# Patient Record
Sex: MALE | Race: WHITE | NOT HISPANIC OR LATINO | Employment: FULL TIME | URBAN - METROPOLITAN AREA
[De-identification: names, ages, dates, MRNs, and addresses within clinical notes are randomized per-mention and may not be internally consistent; named-entity substitution may affect disease eponyms.]

---

## 2023-02-14 ENCOUNTER — TRANSFERRED RECORDS (OUTPATIENT)
Dept: HEALTH INFORMATION MANAGEMENT | Facility: CLINIC | Age: 28
End: 2023-02-14
Payer: COMMERCIAL

## 2023-02-15 ENCOUNTER — TRANSFERRED RECORDS (OUTPATIENT)
Dept: HEALTH INFORMATION MANAGEMENT | Facility: CLINIC | Age: 28
End: 2023-02-15
Payer: COMMERCIAL

## 2023-02-20 PROCEDURE — 99285 EMERGENCY DEPT VISIT HI MDM: CPT | Performed by: EMERGENCY MEDICINE

## 2023-02-20 PROCEDURE — 96375 TX/PRO/DX INJ NEW DRUG ADDON: CPT

## 2023-02-20 PROCEDURE — 99285 EMERGENCY DEPT VISIT HI MDM: CPT | Mod: 25

## 2023-02-20 PROCEDURE — 96374 THER/PROPH/DIAG INJ IV PUSH: CPT

## 2023-02-21 ENCOUNTER — HOSPITAL ENCOUNTER (EMERGENCY)
Facility: CLINIC | Age: 28
Discharge: HOME | End: 2023-02-21
Attending: EMERGENCY MEDICINE | Admitting: EMERGENCY MEDICINE
Payer: COMMERCIAL

## 2023-02-21 VITALS
TEMPERATURE: 99.5 F | WEIGHT: 172 LBS | SYSTOLIC BLOOD PRESSURE: 114 MMHG | RESPIRATION RATE: 16 BRPM | OXYGEN SATURATION: 94 % | DIASTOLIC BLOOD PRESSURE: 64 MMHG | HEIGHT: 70 IN | HEART RATE: 83 BPM | BODY MASS INDEX: 24.62 KG/M2

## 2023-02-21 DIAGNOSIS — L50.9 URTICARIA: ICD-10-CM

## 2023-02-21 LAB
GROUP A STREP BY PCR: NOT DETECTED
HOLD SPECIMEN: NORMAL
HOLD SPECIMEN: NORMAL

## 2023-02-21 PROCEDURE — 96372 THER/PROPH/DIAG INJ SC/IM: CPT

## 2023-02-21 PROCEDURE — 250N000011 HC RX IP 250 OP 636: Performed by: EMERGENCY MEDICINE

## 2023-02-21 PROCEDURE — 250N000009 HC RX 250: Performed by: EMERGENCY MEDICINE

## 2023-02-21 PROCEDURE — 87651 STREP A DNA AMP PROBE: CPT | Performed by: EMERGENCY MEDICINE

## 2023-02-21 RX ORDER — DIPHENHYDRAMINE HYDROCHLORIDE 50 MG/ML
25 INJECTION INTRAMUSCULAR; INTRAVENOUS ONCE
Status: COMPLETED | OUTPATIENT
Start: 2023-02-21 | End: 2023-02-21

## 2023-02-21 RX ORDER — EPINEPHRINE 0.3 MG/.3ML
0.3 INJECTION SUBCUTANEOUS PRN
Qty: 2 EACH | Refills: 0 | Status: SHIPPED | OUTPATIENT
Start: 2023-02-21

## 2023-02-21 RX ORDER — EPINEPHRINE 0.3 MG/.3ML
0.3 INJECTION SUBCUTANEOUS ONCE
Status: DISCONTINUED | OUTPATIENT
Start: 2023-02-21 | End: 2023-02-21

## 2023-02-21 RX ORDER — METHYLPREDNISOLONE SODIUM SUCCINATE 125 MG/2ML
125 INJECTION, POWDER, LYOPHILIZED, FOR SOLUTION INTRAMUSCULAR; INTRAVENOUS ONCE
Status: COMPLETED | OUTPATIENT
Start: 2023-02-21 | End: 2023-02-21

## 2023-02-21 RX ADMIN — EPINEPHRINE 0.5 MG: 1 INJECTION INTRAMUSCULAR; INTRAVENOUS; SUBCUTANEOUS at 01:15

## 2023-02-21 RX ADMIN — FAMOTIDINE 20 MG: 10 INJECTION, SOLUTION INTRAVENOUS at 01:11

## 2023-02-21 RX ADMIN — METHYLPREDNISOLONE SODIUM SUCCINATE 125 MG: 125 INJECTION, POWDER, FOR SOLUTION INTRAMUSCULAR; INTRAVENOUS at 01:13

## 2023-02-21 RX ADMIN — DIPHENHYDRAMINE HYDROCHLORIDE 25 MG: 50 INJECTION, SOLUTION INTRAMUSCULAR; INTRAVENOUS at 01:13

## 2023-02-21 ASSESSMENT — ACTIVITIES OF DAILY LIVING (ADL)
ADLS_ACUITY_SCORE: 33
ADLS_ACUITY_SCORE: 35

## 2023-02-21 NOTE — DISCHARGE INSTRUCTIONS
Please make an appointment to follow up with Primary [facility] - [facility] (phone: [phone]) if not improving.    If you develop throat closing sensation, swelling in your mouth, shortness of breath, vomiting or diarrhea use the epinephrine pen return to the emergency department

## 2023-02-21 NOTE — ED PROVIDER NOTES
"    [facility] EMERGENCY DEPARTMENT (Formerly [facility])    2/21/23      History     Chief Complaint   Patient presents with     Allergic Reaction     The history is provided by the patient.     Michael Lindo is a 27 year old male who presents to the Emergency Department with hives. Patient reports he developed hives nearly 2 weeks ago, on 2/8, immediately after touching a succulent plant in potting soil. He has since gotten rid of the plant and cleaned his home. Patient reports last week he went to urgent care on both 2/14 and 2/15 and was started on cetirizine. Patient reports he was also started on a 5 day course of prednisone. He finished this on Sunday, 2/19, and in less than 24 hours symptoms have \"exploded\" again. He states he has hives from head to toe, states they are itchy. Patient reports yesterday, 2/20, he developed throat tightening and pain in his throat, the sides of his tongue, cheeks, and teeth. Patient reports he has been swallowing secretions but states he has pain with swallowing. Patient reports he feels like he has to think really hard in order to get enough air in. He denies voice change. Patient denies known food allergies. No new foods, detergents, or soaps. He has not had symptoms like this in the past. Patient denies vomiting or diarrhea. Patient reports he also tested positive for COVID during this, believes he tested positive on 2/12. He states he was then found to have pneumonia and was started on amoxicillin on 2/15.    Past Medical History  No past medical history on file.  No past surgical history on file.  No current outpatient medications on file.    No Known Allergies  Family History  No family history on file.  Social History          A medically appropriate review of systems was performed with pertinent positives and negatives noted in the HPI, and all other systems negative.    Physical Exam   BP: 128/75  Pulse: 117  Temp: 99.5  F (37.5  C)  Resp: 16  Height: 177.8 cm (5' " "10\")  Weight: 78 kg (172 lb)  SpO2: 96 %  Physical Exam  Physical Exam   Constitutional: oriented to person, place, and time. appears well-developed and well-nourished.   HENT:   Head: Normocephalic and atraumatic. No intraoral swelling of the tongue or posterior pharynx.  Neck: Normal range of motion. No stridor.  Pulmonary/Chest: Effort normal. No respiratory distress.   Cardiac: No murmurs, rubs, gallops. RRR.  Abdominal: Abdomen soft, nontender, nondistended. No rebound tenderness.  MSK: Long bones without deformity or evidence of trauma  Neurological: alert and oriented to person, place, and time.   Skin: Skin is warm and dry. Urticaria over arms, abdomen, neck  Psychiatric:  normal mood and affect.  behavior is normal. Thought content normal.      ED Course, Procedures, & Data   12:47 AM  The patient was seen and examined by Mikhail Farias MD in Room ED15.      Procedures       ED Course Selections:   Critical Care Addendum  My initial assessment, based on my focused history and physical exam, established a high suspicion that Michael Lindo has anaphylaxis, which requires immediate intervention, and therefore he is critically ill.     After the initial assessment, the care team initiated medication therapy with epi pen, methylprednisone, benadryl to provide stabilization care. Due to the critical nature of this patient, I reassessed vital signs, physical exam and respiratory status multiple times prior to his disposition.     Time also spent performing documentation and reviewing test results.     Critical care time (excluding teaching time and procedures): 30 minutes.                Results for orders placed or performed during the hospital encounter of 02/21/23   [place] Draw     Status: None (In process)    Narrative    The following orders were created for panel order [place] Draw.  Procedure                               Abnormality         Status                     ---------                          "      -----------         ------                     Extra Green Top (Lithium...[[id number]]                      In process                 Extra Purple Top Tube[[id number]]                            In process                   Please view results for these tests on the individual orders.     Medications - No data to display  Labs Ordered and Resulted from Time of ED Arrival to Time of ED Departure - No data to display  No orders to display              Medical Decision Making  The patient's presentation was of high complexity (an acute health issue posing potential threat to life or bodily function).    The patient's evaluation involved:  strong consideration of a test (ct neck) that was ultimately deferred    The patient's management necessitated moderate risk (prescription drug management including medications given in the ED) and high risk (drug therapy requiring intensive monitoring (epipen)).      Assessment & Plan    MDM   patient presenting with allergic reaction, unknown trigger.  She is given epinephrine pen due to throat and mouth symptoms however he did appear quite well overall.  His symptoms drastically improved, hives did improve after Benadryl, steroids, epinephrine.  Patient was watched for several hours and discharged with an EpiPen prescription.  Discussed need for allergist if not improving, he will follow-up with [facility].  He is given return precautions    I have reviewed the nursing notes. I have reviewed the findings, diagnosis, plan and need for follow up with the patient.    New Prescriptions    No medications on file       Final diagnoses:   Urticaria     Rosalind LAWRENCE am serving as a trained medical scribe to document services personally performed by Mikhail Farias MD, based on the provider's statements to me.      Mikhail LAWRENCE MD, was physically present and have reviewed and verified the accuracy of this note documented by Rosalind Dey.     Mikhail Farias MD  [facility]  [facility] EMERGENCY DEPARTMENT  2/20/2023     Mikhail Farias MD  02/21/23 2168

## 2023-02-21 NOTE — ED TRIAGE NOTES
Patient reports allergic reaction for the past two weeks. Reports hives to back, abdomen, neck, arms, and legs. Reports swelling to throat and tongue tonight. Denies shortness of breath. Able to maintain secretions. Has been taking steroids, prescription completed 2-.     Triage Assessment       Row Name 02/20/23 1619       Triage Assessment (Adult)    Airway WDL X    Additional Documentation --  Patient reports swelling to throat and tongue       Respiratory WDL    Respiratory WDL WDL       Skin Circulation/Temperature WDL    Skin Circulation/Temperature WDL X  Hives to abdomen, neck, back, legs, arms       Cardiac WDL    Cardiac WDL X;rhythm    Pulse Rate & Regularity tachycardic       Peripheral/Neurovascular WDL    Peripheral Neurovascular WDL WDL       Cognitive/Neuro/Behavioral WDL    Cognitive/Neuro/Behavioral WDL WDL

## 2023-02-23 ENCOUNTER — MEDICAL CORRESPONDENCE (OUTPATIENT)
Dept: HEALTH INFORMATION MANAGEMENT | Facility: CLINIC | Age: 28
End: 2023-02-23
Payer: COMMERCIAL

## 2023-02-23 ENCOUNTER — TRANSFERRED RECORDS (OUTPATIENT)
Dept: HEALTH INFORMATION MANAGEMENT | Facility: CLINIC | Age: 28
End: 2023-02-23
Payer: COMMERCIAL

## 2023-02-24 ENCOUNTER — TRANSFERRED RECORDS (OUTPATIENT)
Dept: HEALTH INFORMATION MANAGEMENT | Facility: CLINIC | Age: 28
End: 2023-02-24
Payer: COMMERCIAL

## 2023-02-27 ENCOUNTER — TRANSCRIBE ORDERS (OUTPATIENT)
Dept: OTHER | Age: 28
End: 2023-02-27

## 2023-02-27 ENCOUNTER — TELEPHONE (OUTPATIENT)
Dept: ALLERGY | Facility: CLINIC | Age: 28
End: 2023-02-27

## 2023-02-27 DIAGNOSIS — L51.9 ERYTHEMA MULTIFORME: Primary | ICD-10-CM

## 2023-02-27 NOTE — TELEPHONE ENCOUNTER
This encounter is being sent to inform the clinic that this patient has a referral from Dr Rell Santiago for the diagnoses of h/o rash- EM? assoc with potting soil exposure and recent COVID infection. Eval for hives VS EM from Covid and has requested that this patient be seen within 1-2 weeks with Dr Joyce.  Based on the availability of our provider(s), we are unable to accommodate this request.      Were all sites offered this patient?  Yes      Does scheduling algorithm request to schedule next available?  Patient has been scheduled for the first available opening with Dr Troy Joyce on 8/25.  We have informed the patient that the clinic will review their referral and reach out if a sooner appointment is medically necessary.

## 2023-03-01 NOTE — TELEPHONE ENCOUNTER
FUTURE VISIT INFORMATION      FUTURE VISIT INFORMATION:    Date: 3.7.23    Time: 8:45    [place] [facility] [place]:    Referring provider:  Jack    Referring providers clinic:  [facility]    Reason for visit/diagnosis  erythema multiforme? urgent referral    RECORDS REQUESTED FROM:       Clinic name Comments Records Status Imaging Status   [facility] 2.24.23, 2.23.23  [facility]    [facility] 2.21.23  [facility] Epic

## 2023-03-06 NOTE — PROGRESS NOTES
[facility] Dermato-allergology Note  Office visit  Encounter Date: Mar 7, 2023  ____________________________________________    CC: No chief complaint on file.      HPI:  (Mar 7, 2023)  Mr. Michael Lindo is a(n) 27 year old male who presents today as new patient for allergy consultation due to persistent rash.  - initially noticed the rash on 2/8/23 immediately after handling succulents (first time exposed to this) and potting soil (previously handled without reaction)  - applied OTC hydrocortisone on the abdomen and lower back which helped a bit with the itching but the rash resolved on its own within 12-24 hours  - this rash continued and he tested positive for COVID on Sunday 2/12/23 with symptoms such sore throat, fevers, headache, chills, sweats, myalgias, cough, dyspnea  - took ibuprofen, tylenol and naproxen for his symptoms as well during his infection  - was seen at urgent care on 2/14/23 and was treated for pneumonia with amoxicillin for 5 days  - was seen again at urgent care on 2/15/23 for the persistent rash and prescribed prednisone 20 mg for 5 days and cetirizine 20 mg BID of cetirizine for a week and was still developing new rash  - was seen in the ER on 2/21/23 for the rash that was spreading to the neck with tightness of the throat and difficulty breathing and swallowing, also felt swelling of the tongue with discomfort, was given IV steroid, benadryl, pepcid and epinephrine and was discharged with an Epi-Pen  - was seen at urgent care on 2/23/23 and was prescribed cetirizine 10 mg twice daily and famotidine 20 mg daily and was diagnosed with erythema multiforme by the physician there and prescribed azithromycin for 5 days   - continues to take Zyrtec, last taken this morning  - states rash has improved but is still present  - otherwise feeling well in usual state of health    Physical exam:  General: In no acute distress, well-developed, well-nourished  Eyes: no  conjunctivitis  ENT: no signs of rhinitis   Pulmonary: no wheezing or coughing  Skin: Focused examination of the skin on test sites was performed = see test results below  - urticarial papules on the right dorsal hand, right arm and thighs    Past Medical History:   There is no problem list on file for this patient.    No past medical history on file.    Allergies:  No Known Allergies    Medications:  Current Outpatient Medications   Medication     EPINEPHrine (ANY BX GENERIC EQUIV) 0.3 MG/0.3ML injection 2-pack     No current facility-administered medications for this visit.       Social History:  The patient works as a resident life director.     Family History:  No family history on file.    Previous Labs, Allergy Tests, Dermatopathology, Imaging:  None    Referred By: No referring provider defined for this encounter.     Allergy Tests:    Past Allergy Test    Order for Future Allergy Testing:    [] Outpatient  [] Inpatient: [facility]..../ Bed ....       Skin Atopy (atopic dermatitis) [] Yes   [x] No .........  Contact allergies:   [] Yes   [x] No  possible nickel allergy as a child, none today   Hand eczema:   [] Yes   [x] No           Leading hand:   [] R   [] L       [] Ambidextrous         Drug allergies:        [] Yes   [x] No      Urticaria/Angioedema  [x] Yes   [] No   Food Allergy:  [] Yes   [x] No    Pets :  [] Yes   [x] No  Cat with no reaction      [x]  Rhinitis   [x] Conjunctivitis   [] Sinusitis   [] Polyposis   [] Otitis   [] Pharyngitis         [x]  Postnasal drip    []  none  Operations:   [] Tonsils   [] Septum   [] Sinus   [] Polyposis        [] Asthma bronchiale   [] Coughing      [x]  none  Symptoms (mostly Rhinoconjunctivitis and Asthma) aggravated by:  Season   [] I   [] II   [] III   [x] IV   [x]V   [x]VI   [x]VII   [x]VIII   [x]IX   []X   []XI   []XII     [] perennial   Day time      [] morning   [] noon      [] evening        [] night    [] whole day........  []  none  Location/changes    []  inside        [] outside   [] mountains    [] sea     [] others.............   []  none  Triggers, specific     [] animals     [] plants     [] dust              [] others ...........................    []  none  Triggers, others       [] work          [] psyche    [] sport            [] others .............................  []  none  Irritant                [] phys efforts [] smoke    [] heat/cold     [] odors  []others............... []  none    Order for PATCH TESTS  Reason for tests (suspected allergy):   Known previous allergies:   Standardized panels  [] Standard panel (40 tests)  [] Preservatives & Antimicrobials (31 tests)  [] Emulsifiers & Additives (25 tests)   [] Perfumes/Flavours & Plants (25 tests)  [] Hairdresser panel (12 tests)  [] Rubber Chemicals (22 tests)  [] Plastics (26 tests)  [] Colorants/Dyes/Food additives (20 tests)  [] Metals (implants/dental) (24 tests)  [] Local anaesthetics/NSAIDs (13 tests)  [] Antibiotics & Antimycotics (14 tests)   [] Corticosteroids (15 tests)   [] Photopatch test (62 tests)   [] others: ...      [] Patient's own products: ...    DO NOT test if chemical or biological identity is unknown!     always ask from patient the product information and safety sheets (MSDS)       Order for PRICK TESTS    Reason for tests (suspected allergy):   Known previous allergies:     Standardized prick panels  [x] Atopic panel (20 tests)  [] Pediatric Panel (12 tests)  [] Milk, Meat, Eggs, Grains (20 tests)   [] Dust, Epithelia, Feathers (10 tests)  [] Fish, Seafood, Shellfish (17 tests)  [] Nuts, Beans (14 tests)  [] Spice, Vegetable, Fruit (17 tests)  [] Pollen Panel = Tree, Grass, Weed (24 tests)  [] Others: ...      [] Patient's own products: ...    DO NOT test if chemical or biological identity is unknown!     always ask from patient the product information and safety sheets (MSDS)     Standardized intradermal tests  [x] Penicillium notatum [x] Aspergillus fumigatus [] House dust  mites D.far & D. pteron  [] Cat    [] dog  [] Others: ...  [] Bee venom   [] Wasp venom  !!Specific protocol with dilutions!!       Order for Drug allergy tests (prick & Intradermal &  patch tests)    [] Penicillin G  [x] Ampicillin [] Cefazolin   [] Ceftriaxone   [] Ceftazidime  [] Bactrim    [] Others: ...  Order for ... 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  - This is not erythema multiforme, rather this is a chronic urticaria, which can have a targetoid/annular presentation as well  - Recommend altering the antihistamine regimen  - Recommend emergency set prednisone  - Can consider Xolair if this persists    # Suspect atopic predisposition with:    Rhinoconjunctivitis and post nasal drip with exacerbation in Spring and Fall  - Recommend atopy screen in the future when he does not have active urticaria    These conclusions are made at the best of one's knowledge and belief based on the provided evidence such as patient's history and allergy test results and they can change over time or can be incomplete because of missing information's.    ==> Treatment Plan:  - Recommend avoiding NSAIDs if possible and if needed, try to avoid taking high doses and favor Tylenol more than ibuprofen or naproxen  - Change cetirizine to fexofenadine 180 mg twice daily (morning and afternoon) and one hydroxyzine 25 mg at bedtime  - Emergency set prednisone and cetirizine prescribed  - Recommend discontinuing pepcid  - Try to taper off antihistamines over the next 6 weeks, however if hives persist, may need to try Xolair and check labs (patient asked to bring [place] lab records)  - Atopy screen in about 6 weeks from now,  including succulents if possible -- will have to be off of antihistamines to do this test  - Prick and intradermal test with ampicillin also    Procedures Performed: Allergy tests, including prick, intradermal and patch tests, drug allergy or provocation tests    Staff and Resident:  Provider    Tina Samano, DO PGY-4  Department of Dermatology  [facility]    Follow-up in Derm-Allergy clinic 6 weeks    I spent a total of [60] minutes with Michael Lindo. This time was spent counseling the patient and/or coordinating care, explaining the allergy tests or procedures, performing allergy tests and assessing the clinical relevance.

## 2023-03-07 ENCOUNTER — OFFICE VISIT (OUTPATIENT)
Dept: ALLERGY | Facility: CLINIC | Age: 28
End: 2023-03-07
Payer: COMMERCIAL

## 2023-03-07 ENCOUNTER — PRE VISIT (OUTPATIENT)
Dept: ALLERGY | Facility: CLINIC | Age: 28
End: 2023-03-07

## 2023-03-07 DIAGNOSIS — Z88.9 ATOPY: ICD-10-CM

## 2023-03-07 DIAGNOSIS — L50.1 CHRONIC IDIOPATHIC URTICARIA: Primary | ICD-10-CM

## 2023-03-07 DIAGNOSIS — J30.2 SEASONAL AND PERENNIAL ALLERGIC RHINOCONJUNCTIVITIS: ICD-10-CM

## 2023-03-07 DIAGNOSIS — R09.82 ALLERGIC RHINITIS WITH POSTNASAL DRIP: ICD-10-CM

## 2023-03-07 DIAGNOSIS — H10.10 SEASONAL AND PERENNIAL ALLERGIC RHINOCONJUNCTIVITIS: ICD-10-CM

## 2023-03-07 DIAGNOSIS — J30.9 ALLERGIC RHINITIS WITH POSTNASAL DRIP: ICD-10-CM

## 2023-03-07 DIAGNOSIS — J30.89 SEASONAL AND PERENNIAL ALLERGIC RHINOCONJUNCTIVITIS: ICD-10-CM

## 2023-03-07 PROCEDURE — 99205 OFFICE O/P NEW HI 60 MIN: CPT | Mod: GC | Performed by: DERMATOLOGY

## 2023-03-07 RX ORDER — FAMOTIDINE 20 MG/1
TABLET, FILM COATED ORAL
COMMUNITY
Start: 2023-02-23

## 2023-03-07 RX ORDER — PREDNISONE 50 MG/1
TABLET ORAL
Qty: 4 TABLET | Refills: 3 | Status: SHIPPED | OUTPATIENT
Start: 2023-03-07

## 2023-03-07 RX ORDER — HYDROXYZINE HYDROCHLORIDE 25 MG/1
25 TABLET, FILM COATED ORAL AT BEDTIME
Qty: 30 TABLET | Refills: 5 | Status: SHIPPED | OUTPATIENT
Start: 2023-03-07

## 2023-03-07 RX ORDER — CETIRIZINE HYDROCHLORIDE 10 MG/1
TABLET ORAL
COMMUNITY
Start: 2023-02-23

## 2023-03-07 RX ORDER — FEXOFENADINE HCL 180 MG/1
TABLET ORAL
Qty: 60 TABLET | Refills: 5 | Status: SHIPPED | OUTPATIENT
Start: 2023-03-07

## 2023-03-07 NOTE — PATIENT INSTRUCTIONS
- Recommend avoiding NSAIDs if possible and if needed, try to avoid taking high doses and try taking Tylenol more than ibuprofen or naproxen  - Change cetirizine to fexofenadine 180 mg twice daily (morning and afternoon) and one hydroxyzine 25 mg at bedtime  - Emergency set prednisone and cetirizine prescribed  - Discontinue Pepcid (famotidine)  - Over the next 6 weeks you can try to taper off of the antihistamines if your hives are not present, but if they return you may have to go back on the antihistamines and we might have to consider Xolair and will do some blood tests (if you can bring your [facility] tests as well)  - Atopy screen (prick testing) in about 6 weeks from now, including succulents if possible -- will have to be off of antihistamines to do this test    Emergency treatment for patients with severe immediate allergic reactions to drugs, food or insect bites:      The clinical appearance of severe immediate type allergic reactions can range from wheals and hives all over the body (urticaria), to swellings in the face (eyes, lips) to sometimes swellings in the tongue or airways with problems to swallow or breathe. These reactions can end up in cardiovascular reactions with collapse and shock.    Stay calm, avoid running around, and alert other people to help you    Immediately take your emergency medication.   For adults (over 16 years old): 2 tablet of antihistamines (e.g. cetirizine 10 mg or fexofenadine 180 mg) and 100 mg prednisone.   For children (less than 16 years old): 1 tablet of antihistamine (e.g. cetirizine 10 mg or fexofenadine 180 mg) and 50 mg prednisone    Swallow however you can, with or without water. This treatment is usually sufficient for treatment of uncomplicated hives and swellings.       Emergency set in key chain box containing 2 tablets prednisone 50 mg and 2 tablets cetirizine 10 mg.    In case of acute swelling of tongue, trouble swallowing, blocking of the airways, breathing  problems, and/or signs of imminent collapse of shock (sweating, weakness, dizziness, paleness), use the adrenalin auto-injector (Epipen   for adults and Epipen   delia for children). Make an injection into the outer thighs, if necessary through clothing.                 Seek immediate emergency medical treatment after use if symptoms persist  Write precise and chronological 12h retrospective diary to evaluate later possible triggers        Troy Joyce, [facility], [place], [facility]; 06/08/2022

## 2023-03-07 NOTE — NURSING NOTE
Chief Complaint   Patient presents with     Allergy Consult     Pt is here for an allergy consult. Referred by [facility] -  with erythema multiforme. Currently treating with famotidine, zyrtec, azithromycin (5 day course) - rash has improved but not gone away completely. 2/8/23 exposed to succulents and potting soil and developed itchy rash. Positive for Covid 2/12/23, then developed pneumonia a few days later.      Shayy CHRISTINE RN

## 2023-03-07 NOTE — LETTER
3/7/2023         RE: Michael Lindo  [address] [address]        Dear Colleague,    Thank you for referring your patient, Michael Lindo, to the [facility] [place]. Please see a copy of my visit note below.    [facility] Dermato-allergology Note  Office visit  Encounter Date: Mar 7, 2023  ____________________________________________    CC: No chief complaint on file.      HPI:  (Mar 7, 2023)  Mr. Michael Lindo is a(n) 27 year old male who presents today as new patient for allergy consultation due to persistent rash.  - initially noticed the rash on 2/8/23 immediately after handling succulents (first time exposed to this) and potting soil (previously handled without reaction)  - applied OTC hydrocortisone on the abdomen and lower back which helped a bit with the itching but the rash resolved on its own within 12-24 hours  - this rash continued and he tested positive for COVID on Sunday 2/12/23 with symptoms such sore throat, fevers, headache, chills, sweats, myalgias, cough, dyspnea  - took ibuprofen, tylenol and naproxen for his symptoms as well during his infection  - was seen at urgent care on 2/14/23 and was treated for pneumonia with amoxicillin for 5 days  - was seen again at urgent care on 2/15/23 for the persistent rash and prescribed prednisone 20 mg for 5 days and cetirizine 20 mg BID of cetirizine for a week and was still developing new rash  - was seen in the ER on 2/21/23 for the rash that was spreading to the neck with tightness of the throat and difficulty breathing and swallowing, also felt swelling of the tongue with discomfort, was given IV steroid, benadryl, pepcid and epinephrine and was discharged with an Epi-Pen  - was seen at urgent care on 2/23/23 and was prescribed cetirizine 10 mg twice daily and famotidine 20 mg daily and was diagnosed with erythema multiforme by the physician there and prescribed azithromycin for 5 days    - continues to take Zyrtec, last taken this morning  - states rash has improved but is still present  - otherwise feeling well in usual state of health    Physical exam:  General: In no acute distress, well-developed, well-nourished  Eyes: no conjunctivitis  ENT: no signs of rhinitis   Pulmonary: no wheezing or coughing  Skin: Focused examination of the skin on test sites was performed = see test results below  - urticarial papules on the right dorsal hand, right arm and thighs    Past Medical History:   There is no problem list on file for this patient.    No past medical history on file.    Allergies:  No Known Allergies    Medications:  Current Outpatient Medications   Medication     EPINEPHrine (ANY BX GENERIC EQUIV) 0.3 MG/0.3ML injection 2-pack     No current facility-administered medications for this visit.       Social History:  The patient works as a resident life director.     Family History:  No family history on file.    Previous Labs, Allergy Tests, Dermatopathology, Imaging:  None    Referred By: No referring provider defined for this encounter.     Allergy Tests:    Past Allergy Test    Order for Future Allergy Testing:    [] Outpatient  [] Inpatient: Mai..../ Bed ....       Skin Atopy (atopic dermatitis) [] Yes   [x] No .........  Contact allergies:   [] Yes   [x] No  possible nickel allergy as a child, none today   Hand eczema:   [] Yes   [x] No           Leading hand:   [] R   [] L       [] Ambidextrous         Drug allergies:        [] Yes   [x] No      Urticaria/Angioedema  [x] Yes   [] No   Food Allergy:  [] Yes   [x] No    Pets :  [] Yes   [x] No  Cat with no reaction      [x]  Rhinitis   [x] Conjunctivitis   [] Sinusitis   [] Polyposis   [] Otitis   [] Pharyngitis         [x]  Postnasal drip    []  none  Operations:   [] Tonsils   [] Septum   [] Sinus   [] Polyposis        [] Asthma bronchiale   [] Coughing      [x]  none  Symptoms (mostly Rhinoconjunctivitis and Asthma) aggravated  by:  Season   [] I   [] II   [] III   [x] IV   [x]V   [x]VI   [x]VII   [x]VIII   [x]IX   []X   []XI   []XII     [] perennial   Day time      [] morning   [] noon      [] evening        [] night    [] whole day........  []  none  Location/changes    [] inside        [] outside   [] mountains    [] sea     [] others.............   []  none  Triggers, specific     [] animals     [] plants     [] dust              [] others ...........................    []  none  Triggers, others       [] work          [] psyche    [] sport            [] others .............................  []  none  Irritant                [] phys efforts [] smoke    [] heat/cold     [] odors  []others............... []  none    Order for PATCH TESTS  Reason for tests (suspected allergy):   Known previous allergies:   Standardized panels  [] Standard panel (40 tests)  [] Preservatives & Antimicrobials (31 tests)  [] Emulsifiers & Additives (25 tests)   [] Perfumes/Flavours & Plants (25 tests)  [] Hairdresser panel (12 tests)  [] Rubber Chemicals (22 tests)  [] Plastics (26 tests)  [] Colorants/Dyes/Food additives (20 tests)  [] Metals (implants/dental) (24 tests)  [] Local anaesthetics/NSAIDs (13 tests)  [] Antibiotics & Antimycotics (14 tests)   [] Corticosteroids (15 tests)   [] Photopatch test (62 tests)   [] others: ...      [] Patient's own products: ...    DO NOT test if chemical or biological identity is unknown!     always ask from patient the product information and safety sheets (MSDS)       Order for PRICK TESTS    Reason for tests (suspected allergy):   Known previous allergies:     Standardized prick panels  [x] Atopic panel (20 tests)  [] Pediatric Panel (12 tests)  [] Milk, Meat, Eggs, Grains (20 tests)   [] Dust, Epithelia, Feathers (10 tests)  [] Fish, Seafood, Shellfish (17 tests)  [] Nuts, Beans (14 tests)  [] Spice, Vegetable, Fruit (17 tests)  [] Pollen Panel = Tree, Grass, Weed (24 tests)  [] Others: ...      [] Patient's own  products: ...    DO NOT test if chemical or biological identity is unknown!     always ask from patient the product information and safety sheets (MSDS)     Standardized intradermal tests  [x] Penicillium notatum [x] Aspergillus fumigatus [] House dust mites D.far & D. pteron  [] Cat    [] dog  [] Others: ...  [] Bee venom   [] Wasp venom  !!Specific protocol with dilutions!!       Order for Drug allergy tests (prick & Intradermal &  patch tests)    [] Penicillin G  [x] Ampicillin [] Cefazolin   [] Ceftriaxone   [] Ceftazidime  [] Bactrim    [] Others: ...  Order for ... 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  - This is not erythema multiforme, rather this is a chronic urticaria, which can have a targetoid/annular presentation as well  - Recommend altering the antihistamine regimen  - Recommend emergency set prednisone  - Can consider Xolair if this persists    # Suspect atopic predisposition with:    Rhinoconjunctivitis and post nasal drip with exacerbation in Spring and Fall  - Recommend atopy screen in the future when he does not have active urticaria    These conclusions are made at the best of one's knowledge and belief based on the provided evidence such as patient's history and allergy test results and they can change over time or can be incomplete because of missing information's.    ==> Treatment Plan:  - Recommend avoiding NSAIDs if possible and if needed, try to avoid taking high doses and favor Tylenol more than ibuprofen or naproxen  - Change cetirizine to fexofenadine 180 mg twice daily (morning and afternoon) and one hydroxyzine 25 mg at bedtime  - Emergency set prednisone and cetirizine  prescribed  - Recommend discontinuing pepcid  - Try to taper off antihistamines over the next 6 weeks, however if hives persist, may need to try Xolair and check labs (patient asked to bring [facility] lab records)  - Atopy screen in about 6 weeks from now, including succulents if possible -- will have to be off of antihistamines to do this test  - Prick and intradermal test with ampicillin also    Procedures Performed: Allergy tests, including prick, intradermal and patch tests, drug allergy or provocation tests    Staff and Resident:  Provider    Tina Samano DO PGY-4  Department of Dermatology  [facility]    Follow-up in Derm-Allergy clinic 6 weeks    I spent a total of [60] minutes with Michael Lindo. This time was spent counseling the patient and/or coordinating care, explaining the allergy tests or procedures, performing allergy tests and assessing the clinical relevance.        Again, thank you for allowing me to participate in the care of your patient.        Sincerely,        Troy Joyce MD

## 2023-03-17 ENCOUNTER — TELEPHONE (OUTPATIENT)
Dept: DERMATOLOGY | Facility: CLINIC | Age: 28
End: 2023-03-17
Payer: COMMERCIAL

## 2023-05-10 ENCOUNTER — LAB (OUTPATIENT)
Dept: LAB | Facility: CLINIC | Age: 28
End: 2023-05-10
Payer: COMMERCIAL

## 2023-05-10 ENCOUNTER — OFFICE VISIT (OUTPATIENT)
Dept: ALLERGY | Facility: CLINIC | Age: 28
End: 2023-05-10
Payer: COMMERCIAL

## 2023-05-10 DIAGNOSIS — L50.1 CHRONIC IDIOPATHIC URTICARIA: Primary | ICD-10-CM

## 2023-05-10 DIAGNOSIS — J30.9 ALLERGIC RHINITIS WITH POSTNASAL DRIP: ICD-10-CM

## 2023-05-10 DIAGNOSIS — Z91.09 HOUSE DUST MITE ALLERGY: ICD-10-CM

## 2023-05-10 DIAGNOSIS — H10.10 SEASONAL AND PERENNIAL ALLERGIC RHINOCONJUNCTIVITIS: ICD-10-CM

## 2023-05-10 DIAGNOSIS — J30.2 SEASONAL AND PERENNIAL ALLERGIC RHINOCONJUNCTIVITIS: ICD-10-CM

## 2023-05-10 DIAGNOSIS — L51.9 ERYTHEMA MULTIFORME: ICD-10-CM

## 2023-05-10 DIAGNOSIS — L50.1 CHRONIC IDIOPATHIC URTICARIA: ICD-10-CM

## 2023-05-10 DIAGNOSIS — Z88.9 DRUG ALLERGY: ICD-10-CM

## 2023-05-10 DIAGNOSIS — Z88.9 ATOPY: ICD-10-CM

## 2023-05-10 DIAGNOSIS — R09.82 ALLERGIC RHINITIS WITH POSTNASAL DRIP: ICD-10-CM

## 2023-05-10 DIAGNOSIS — J30.89 SEASONAL AND PERENNIAL ALLERGIC RHINOCONJUNCTIVITIS: ICD-10-CM

## 2023-05-10 LAB
BASOPHILS # BLD AUTO: 0 10E3/UL (ref 0–0.2)
BASOPHILS NFR BLD AUTO: 0 %
CRP SERPL-MCNC: 15.8 MG/L
EOSINOPHIL # BLD AUTO: 0 10E3/UL (ref 0–0.7)
EOSINOPHIL NFR BLD AUTO: 1 %
ERYTHROCYTE [DISTWIDTH] IN BLOOD BY AUTOMATED COUNT: 11.7 % (ref 10–15)
HCT VFR BLD AUTO: 41.5 % (ref 40–53)
HGB BLD-MCNC: 14.5 G/DL (ref 13.3–17.7)
IMM GRANULOCYTES # BLD: 0 10E3/UL
IMM GRANULOCYTES NFR BLD: 0 %
LYMPHOCYTES # BLD AUTO: 1.7 10E3/UL (ref 0.8–5.3)
LYMPHOCYTES NFR BLD AUTO: 21 %
MCH RBC QN AUTO: 28.6 PG (ref 26.5–33)
MCHC RBC AUTO-ENTMCNC: 34.9 G/DL (ref 31.5–36.5)
MCV RBC AUTO: 82 FL (ref 78–100)
MONOCYTES # BLD AUTO: 0.4 10E3/UL (ref 0–1.3)
MONOCYTES NFR BLD AUTO: 5 %
NEUTROPHILS # BLD AUTO: 6 10E3/UL (ref 1.6–8.3)
NEUTROPHILS NFR BLD AUTO: 73 %
NRBC # BLD AUTO: 0 10E3/UL
NRBC BLD AUTO-RTO: 0 /100
PLATELET # BLD AUTO: 327 10E3/UL (ref 150–450)
RBC # BLD AUTO: 5.07 10E6/UL (ref 4.4–5.9)
WBC # BLD AUTO: 8.1 10E3/UL (ref 4–11)

## 2023-05-10 PROCEDURE — 85025 COMPLETE CBC W/AUTO DIFF WBC: CPT | Performed by: PATHOLOGY

## 2023-05-10 PROCEDURE — 36415 COLL VENOUS BLD VENIPUNCTURE: CPT | Performed by: PATHOLOGY

## 2023-05-10 PROCEDURE — 99214 OFFICE O/P EST MOD 30 MIN: CPT | Mod: 25 | Performed by: DERMATOLOGY

## 2023-05-10 PROCEDURE — 83516 IMMUNOASSAY NONANTIBODY: CPT | Mod: 90 | Performed by: PATHOLOGY

## 2023-05-10 PROCEDURE — 95018 ALL TSTG PERQ&IQ DRUGS/BIOL: CPT | Performed by: DERMATOLOGY

## 2023-05-10 PROCEDURE — 83520 IMMUNOASSAY QUANT NOS NONAB: CPT | Mod: 90 | Performed by: PATHOLOGY

## 2023-05-10 PROCEDURE — 95004 PERQ TESTS W/ALRGNC XTRCS: CPT | Performed by: DERMATOLOGY

## 2023-05-10 PROCEDURE — 82785 ASSAY OF IGE: CPT | Mod: 90 | Performed by: PATHOLOGY

## 2023-05-10 PROCEDURE — 95024 IQ TESTS W/ALLERGENIC XTRCS: CPT | Performed by: DERMATOLOGY

## 2023-05-10 PROCEDURE — 99000 SPECIMEN HANDLING OFFICE-LAB: CPT | Performed by: PATHOLOGY

## 2023-05-10 PROCEDURE — 86140 C-REACTIVE PROTEIN: CPT | Performed by: PATHOLOGY

## 2023-05-10 RX ORDER — FEXOFENADINE HCL 180 MG/1
180 TABLET ORAL 2 TIMES DAILY
Qty: 60 TABLET | Refills: 5 | Status: SHIPPED | OUTPATIENT
Start: 2023-05-10 | End: 2023-07-21

## 2023-05-10 NOTE — LETTER
5/10/2023         RE: Michael Lindo  [address] [facility] [address]        Dear Colleague,    Thank you for referring your patient, Michael Lindo, to the [facility] [place]. Please see a copy of my visit note below.    [facility] Dermato-allergology Note  Office visit  Encounter Date: May 10, 2023  ____________________________________________    CC: No chief complaint on file.      HPI:  (May 10, 2023)  Mr. Michael Lindo is a(n) 28 year old male who presents today as a return patient for allergy consultation  - Follow-up in Derm-Allergy clinic 6 weeks  - patient still has a very strong physical urticaria. Spontaneous hives have reduced a lot on Allegra. Just small, random hives sometimes. The hives are somehow reduced in activity.  - had to stop the Hydroxyzine, made him too tired.  --> stopped the Allegra for 9 days and after couple days some spontaneous hives, but not major increase. Just the physical hives are still active.    Physical exam:  General: In no acute distress, well-developed, well-nourished  Eyes: no conjunctivitis  ENT: no signs of rhinitis   Pulmonary: no wheezing or coughing  Skin:in the moment no active lesions    Earlier History and Allergy exams:  (Mar 7, 2023)  - initially noticed the rash on 2/8/23 immediately after handling succulents (first time exposed to this) and potting soil (previously handled without reaction)  - applied OTC hydrocortisone on the abdomen and lower back which helped a bit with the itching but the rash resolved on its own within 12-24 hours  - this rash continued and he tested positive for COVID on Sunday 2/12/23 with symptoms such sore throat, fevers, headache, chills, sweats, myalgias, cough, dyspnea  - took ibuprofen, tylenol and naproxen for his symptoms as well during his infection  - was seen at urgent care on 2/14/23 and was treated for pneumonia with amoxicillin for 5 days  - was seen again at urgent  care on 2/15/23 for the persistent rash and prescribed prednisone 20 mg for 5 days and cetirizine 20 mg BID of cetirizine for a week and was still developing new rash  - was seen in the ER on 2/21/23 for the rash that was spreading to the neck with tightness of the throat and difficulty breathing and swallowing, also felt swelling of the tongue with discomfort, was given IV steroid, benadryl, pepcid and epinephrine and was discharged with an Epi-Pen  - was seen at urgent care on 2/23/23 and was prescribed cetirizine 10 mg twice daily and famotidine 20 mg daily and was diagnosed with erythema multiforme by the physician there and prescribed azithromycin for 5 days   - continues to take Zyrtec, last taken this morning  - states rash has improved but is still present    - urticarial papules on the right dorsal hand, right arm and thighs    Past Medical History:   There is no problem list on file for this patient.    No past medical history on file.    Allergies:  No Known Allergies    Medications:  Current Outpatient Medications   Medication     cetirizine (ZYRTEC) 10 MG tablet     EPINEPHrine (ANY BX GENERIC EQUIV) 0.3 MG/0.3ML injection 2-pack     famotidine (PEPCID) 20 MG tablet     fexofenadine (ALLEGRA) 180 MG tablet     hydrOXYzine (ATARAX) 25 MG tablet     predniSONE (DELTASONE) 50 MG tablet     No current facility-administered medications for this visit.       Social History:  The patient works as a resident life director.     Family History:  No family history on file.    Previous Labs, Allergy Tests, Dermatopathology, Imaging:  None    Referred By: No referring provider defined for this encounter.     Allergy Tests:    Past Allergy Test    Order for Future Allergy Testing:    [] Outpatient  [] Inpatient: Mai..../ Bed ....       Skin Atopy (atopic dermatitis) [] Yes   [x] No .........  Contact allergies:   [] Yes   [x] No  possible nickel allergy as a child, none today   Hand eczema:   [] Yes   [x] No            Leading hand:   [] R   [] L       [] Ambidextrous         Drug allergies:        [] Yes   [x] No      Urticaria/Angioedema  [x] Yes   [] No   Food Allergy:  [] Yes   [x] No    Pets :  [] Yes   [x] No  Cat with no reaction      [x]  Rhinitis   [x] Conjunctivitis   [] Sinusitis   [] Polyposis   [] Otitis   [] Pharyngitis         [x]  Postnasal drip    []  none  Operations:   [] Tonsils   [] Septum   [] Sinus   [] Polyposis        [] Asthma bronchiale   [] Coughing      [x]  none  Symptoms (mostly Rhinoconjunctivitis and Asthma) aggravated by:  Season   [] I   [] II   [] III   [x] IV   [x]V   [x]VI   [x]VII   [x]VIII   [x]IX   []X   []XI   []XII     [] perennial   Day time      [] morning   [] noon      [] evening        [] night    [] whole day........  []  none  Location/changes    [] inside        [] outside   [] mountains    [] sea     [] others.............   []  none  Triggers, specific     [] animals     [] plants     [] dust              [] others ...........................    []  none  Triggers, others       [] work          [] psyche    [] sport            [] others .............................  []  none  Irritant                [] phys efforts [] smoke    [] heat/cold     [] odors  []others............... []  none    Order for PATCH TESTS  Reason for tests (suspected allergy):   Known previous allergies:   Standardized panels  [] Standard panel (40 tests)  [] Preservatives & Antimicrobials (31 tests)  [] Emulsifiers & Additives (25 tests)   [] Perfumes/Flavours & Plants (25 tests)  [] Hairdresser panel (12 tests)  [] Rubber Chemicals (22 tests)  [] Plastics (26 tests)  [] Colorants/Dyes/Food additives (20 tests)  [] Metals (implants/dental) (24 tests)  [] Local anaesthetics/NSAIDs (13 tests)  [] Antibiotics & Antimycotics (14 tests)   [] Corticosteroids (15 tests)   [] Photopatch test (62 tests)   [] others: ...      [] Patient's own products: ...    DO NOT test if chemical or biological identity is  unknown!     always ask from patient the product information and safety sheets (MSDS)       Order for PRICK TESTS    Reason for tests (suspected allergy):   Known previous allergies:     Standardized prick panels  [x] Atopic panel (20 tests)  [] Pediatric Panel (12 tests)  [] Milk, Meat, Eggs, Grains (20 tests)   [] Dust, Epithelia, Feathers (10 tests)  [] Fish, Seafood, Shellfish (17 tests)  [] Nuts, Beans (14 tests)  [] Spice, Vegetable, Fruit (17 tests)  [] Pollen Panel = Tree, Grass, Weed (24 tests)  [] Others: ...      [] Patient's own products: ...    DO NOT test if chemical or biological identity is unknown!     always ask from patient the product information and safety sheets (MSDS)     Standardized intradermal tests  [x] Penicillium notatum [x] Aspergillus fumigatus [x] House dust mites D.far & D. pteron  [] Cat    [] dog  [] Others: ...  [] Bee venom   [] Wasp venom  !!Specific protocol with dilutions!!       Order for Drug allergy tests (prick & Intradermal &  patch tests)    [] Penicillin G  [x] Ampicillin [] Cefazolin   [] Ceftriaxone   [] Ceftazidime  [] Bactrim    [] Others: ...  Order for ... as test date    Atopy Screen (Placed May 10, 2023)  No Substance Readings (15 min) Evaluation   POS Histamine 1mg/ml ++    NEG NaCl 0.9% -      No Substance Readings (15 min) Evaluation   1 Alternaria alternata (tenuis)  ++    2 Cladosporium herbarum -    3 Aspergillus fumigatus -    4 Penicillium notatum -    5 Dermatophagoides pteronyssinus -    6 Dermatophagoides farinae -    7 Dog epithelium (canis spp) -    8 Cat hair (irais catus) -    9 Cockroach   (Blatella americana & germanica) -    10 Grass mix midwest   (Viola, Orchard, Redtop, Shlomo) +/++    11 Parish grass (sorghum halepense) +    12 Weed mix   (common Cocklebur, Lamb s quarters, rough redroot Pigweed, Dock/Sorrel) -    13 Mug wort (artemisia vulgare) +    14 Ragweed giant/short (ambrosia spp) ++    15 White birch (Betula papyrifera) +/++    16  Tree mix 1 (Pecan, Maple BHR, Oak RVW, american [place], black [place]) +/++    17 Red cedar (juniperus virginia) -    18 Tree mix 2   (white [place], river/red Birch, black [place], common [place], american Elm) -    19 Box elder/Maple mix (acer spp) -    20 Clarendon shagbark (carya ovata) +/++      Prick/prick soil     Conclusion      Intradermal Testing (Placed May 10, 2023)  No Substance Conc.  Reading (15min)  immediate Papule [mm] / Erythema [mm] Reading   (... days)  delayed Papule [mm] / Erythema [mm] Remarks   DF Standard Dust Mite - D. Farinae 1:10 +/++ 10/15  -    DP Standard Dust Mite - D. 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  - Recommend atopy screen in the future when he does not have active urticaria    Labs in [place] 2/15/2023: CRP elevate 51.1, in CBC diff Lymphocytes low and Neutrophils borderline elevated, ESR slightly  elevated    These conclusions are made at the best of one's knowledge and belief based on the provided evidence such as patient's history and allergy test results and they can change over time or can be incomplete because of missing information's.    ==> Treatment Plan:  - Recommend avoiding NSAIDs if possible and if needed, try to avoid taking high doses and favor Tylenol more than ibuprofen or naproxen  - Emergency set prednisone and cetirizine prescribed    >> try to reduce exposure to HOUSE DUST MITES (info given)  >> continue with Allegra 180mg morning and evening       Procedures Performed: Allergy tests, including prick, intradermal tests    {[id number]}: provider    Follow-up in Derm-Allergy clinic ***  ___________________________    I spent a total of *** minutes with Michael Lindo during today s  visit. This time was spent discussing all the individual test results, correlating them to the clinical relevance, counseling the patient and/or coordinating care and performing allergy tests or procedures.           Again, thank you for allowing me to participate in the care of your patient.        Sincerely,        Troy Joyce MD

## 2023-05-10 NOTE — PATIENT INSTRUCTIONS
House Dust Mite Allergy        The house dust mite is an arachnid about 0.3 mm in size and not visible to the naked eye. There are around 150 species of house dust mites in the world. One mite produces up to 40 fecal droppings a day. One teaspoonful of bedroom dust contains an average of nearly 1000 mites and 250,000 minute droppings.    Causes and triggers of house dust mite allergy  The house dust mite requires a warm, moist environment without light in order to live and reproduce. Our beds are ideal. The mite feeds on human and animal skin scales. The allergen is mainly contained in the mite's feces. The feces contain allergy-triggering constituents which are spread in fine dust, are breathed in and can cause an allergic reaction.    Symptoms  When the allergens come into contact with the mucous membranes in the eyes, nose, mouth and throat, sufferers develop symptoms typical of an allergic cold (allergic rhinitis) or an allergic inflammation of the conjunctiva (allergic conjunctivitis): blocked or runny nose, sneezing, red, itchy eyes. If all of these symptoms are present, then the condition is also known as rhinoconjunctivitis. Often, the upper respiratory tract becomes chronically inflamed, primarily because house dust mites are present all year round.  The symptoms of house dust mite allergy typically occur in the morning and are more frequent in the cold months of the year.    Therapy and treatment  As a first step, mattress, pillows and duvet/comforter should be placed in mite-proof or anti-mite covers, sometimes known as encasings. Alternatively you can use pillows or comforter that can be washed at over 130 F monthly. At the same time, house dust should be minimized. If necessary, the symptoms can be treated with medication, for example antihistamines in the form of nasal sprays, eye drops and tablets. Desensitization/specific immunotherapy (SIT) is recommended for house dust allergy if all the measures  "above are not sufficient.    Tips and tricks:  Keep room temperature at 66-70 F and relative air humidity at a maximum of 50%.  Ideally, thoroughly air your home two to three times a day for 5 to 10 minutes each time.  Wash bed linens in at least 130 F every week.  Remove stuffed animals or freeze them every other week.  Keep ceiling fans off in the bedroom as they can stir up dust mite allergens.  Remove dust from furniture with a damp cloth and regularly wet mop floors.  Do not put pot and hydroponic plants in the bedroom and also avoid putting too many in living areas, as they increase room humidity.  When staying overnight in other accommodations, we recommend taking your own bed linen and the above anti-mite mattress covers with you.  Remove upholstered furniture from the bedroom and consider removing the carpets. Ideally, use sealed parquet or laminate bib, cork tiles or bib made of wood, novilon or PVC.  Maybe additionally reduce dust mites in mattress, upholstery, or bib using hot steam .      Modified from \"House Dust Mite Allergy\" by [employer] [facility].    "

## 2023-05-10 NOTE — NURSING NOTE
Chief Complaint   Patient presents with     Allergy Testing Followup     Pt is here for a follow up. Reports hives aren't as strong but are still present. Stopped antihistamines ~5 days ago for appointment and has noticed increase in hives. Pt has noticed swelling on body with exertion/trauma     Shayy CHRISTINE RN

## 2023-05-10 NOTE — PROGRESS NOTES
[facility] Dermato-allergology Note  Office visit  Encounter Date: May 10, 2023  ____________________________________________    CC: No chief complaint on file.      HPI:  (May 10, 2023)  Mr. Michael Lindo is a(n) 28 year old male who presents today as a return patient for allergy consultation  - Follow-up in Derm-Allergy clinic 6 weeks  - patient still has a very strong physical urticaria. Spontaneous hives have reduced a lot on Allegra. Just small, random hives sometimes. The hives are somehow reduced in activity.  - had to stop the Hydroxyzine, made him too tired.  --> stopped the Allegra for 9 days and after couple days some spontaneous hives, but not major increase. Just the physical hives are still active.    Physical exam:  General: In no acute distress, well-developed, well-nourished  Eyes: no conjunctivitis  ENT: no signs of rhinitis   Pulmonary: no wheezing or coughing  Skin:in the moment no active lesions    Earlier History and Allergy exams:  (Mar 7, 2023)  - initially noticed the rash on 2/8/23 immediately after handling succulents (first time exposed to this) and potting soil (previously handled without reaction)  - applied OTC hydrocortisone on the abdomen and lower back which helped a bit with the itching but the rash resolved on its own within 12-24 hours  - this rash continued and he tested positive for COVID on Sunday 2/12/23 with symptoms such sore throat, fevers, headache, chills, sweats, myalgias, cough, dyspnea  - took ibuprofen, tylenol and naproxen for his symptoms as well during his infection  - was seen at urgent care on 2/14/23 and was treated for pneumonia with amoxicillin for 5 days  - was seen again at urgent care on 2/15/23 for the persistent rash and prescribed prednisone 20 mg for 5 days and cetirizine 20 mg BID of cetirizine for a week and was still developing new rash  - was seen in the ER on 2/21/23 for the rash that was spreading to the neck with tightness  of the throat and difficulty breathing and swallowing, also felt swelling of the tongue with discomfort, was given IV steroid, benadryl, pepcid and epinephrine and was discharged with an Epi-Pen  - was seen at urgent care on 2/23/23 and was prescribed cetirizine 10 mg twice daily and famotidine 20 mg daily and was diagnosed with erythema multiforme by the physician there and prescribed azithromycin for 5 days   - continues to take Zyrtec, last taken this morning  - states rash has improved but is still present    - urticarial papules on the right dorsal hand, right arm and thighs    Past Medical History:   There is no problem list on file for this patient.    No past medical history on file.    Allergies:  No Known Allergies    Medications:  Current Outpatient Medications   Medication     cetirizine (ZYRTEC) 10 MG tablet     EPINEPHrine (ANY BX GENERIC EQUIV) 0.3 MG/0.3ML injection 2-pack     famotidine (PEPCID) 20 MG tablet     fexofenadine (ALLEGRA) 180 MG tablet     hydrOXYzine (ATARAX) 25 MG tablet     predniSONE (DELTASONE) 50 MG tablet     No current facility-administered medications for this visit.       Social History:  The patient works as a resident life director.     Family History:  No family history on file.    Previous Labs, Allergy Tests, Dermatopathology, Imaging:  None    Referred By: No referring provider defined for this encounter.     Allergy Tests:    Past Allergy Test    Order for Future Allergy Testing:    [] Outpatient  [] Inpatient: Mai..../ Bed ....       Skin Atopy (atopic dermatitis) [] Yes   [x] No .........  Contact allergies:   [] Yes   [x] No  possible nickel allergy as a child, none today   Hand eczema:   [] Yes   [x] No           Leading hand:   [] R   [] L       [] Ambidextrous         Drug allergies:        [] Yes   [x] No      Urticaria/Angioedema  [x] Yes   [] No   Food Allergy:  [] Yes   [x] No    Pets :  [] Yes   [x] No  Cat with no reaction      [x]  Rhinitis   [x]  Conjunctivitis   [] Sinusitis   [] Polyposis   [] Otitis   [] Pharyngitis         [x]  Postnasal drip    []  none  Operations:   [] Tonsils   [] Septum   [] Sinus   [] Polyposis        [] Asthma bronchiale   [] Coughing      [x]  none  Symptoms (mostly Rhinoconjunctivitis and Asthma) aggravated by:  Season   [] I   [] II   [] III   [x] IV   [x]V   [x]VI   [x]VII   [x]VIII   [x]IX   []X   []XI   []XII     [] perennial   Day time      [] morning   [] noon      [] evening        [] night    [] whole day........  []  none  Location/changes    [] inside        [] outside   [] mountains    [] sea     [] others.............   []  none  Triggers, specific     [] animals     [] plants     [] dust              [] others ...........................    []  none  Triggers, others       [] work          [] psyche    [] sport            [] others .............................  []  none  Irritant                [] phys efforts [] smoke    [] heat/cold     [] odors  []others............... []  none    Order for PATCH TESTS  Reason for tests (suspected allergy):   Known previous allergies:   Standardized panels  [] Standard panel (40 tests)  [] Preservatives & Antimicrobials (31 tests)  [] Emulsifiers & Additives (25 tests)   [] Perfumes/Flavours & Plants (25 tests)  [] Hairdresser panel (12 tests)  [] Rubber Chemicals (22 tests)  [] Plastics (26 tests)  [] Colorants/Dyes/Food additives (20 tests)  [] Metals (implants/dental) (24 tests)  [] Local anaesthetics/NSAIDs (13 tests)  [] Antibiotics & Antimycotics (14 tests)   [] Corticosteroids (15 tests)   [] Photopatch test (62 tests)   [] others: ...      [] Patient's own products: ...    DO NOT test if chemical or biological identity is unknown!     always ask from patient the product information and safety sheets (MSDS)       Order for PRICK TESTS    Reason for tests (suspected allergy):   Known previous allergies:     Standardized prick panels  [x] Atopic panel (20 tests)  []  Pediatric Panel (12 tests)  [] Milk, Meat, Eggs, Grains (20 tests)   [] Dust, Epithelia, Feathers (10 tests)  [] Fish, Seafood, Shellfish (17 tests)  [] Nuts, Beans (14 tests)  [] Spice, Vegetable, Fruit (17 tests)  [] Pollen Panel = Tree, Grass, Weed (24 tests)  [] Others: ...      [] Patient's own products: ...    DO NOT test if chemical or biological identity is unknown!     always ask from patient the product information and safety sheets (MSDS)     Standardized intradermal tests  [x] Penicillium notatum [x] Aspergillus fumigatus [x] House dust mites D.far & D. pteron  [] Cat    [] dog  [] Others: ...  [] Bee venom   [] Wasp venom  !!Specific protocol with dilutions!!       Order for Drug allergy tests (prick & Intradermal &  patch tests)    [] Penicillin G  [x] Ampicillin [] Cefazolin   [] Ceftriaxone   [] Ceftazidime  [] Bactrim    [] Others: ...  Order for ... as test date    Atopy Screen (Placed May 10, 2023)  No Substance Readings (15 min) Evaluation   POS Histamine 1mg/ml ++    NEG NaCl 0.9% -      No Substance Readings (15 min) Evaluation   1 Alternaria alternata (tenuis)  ++    2 Cladosporium herbarum -    3 Aspergillus fumigatus -    4 Penicillium notatum -    5 Dermatophagoides pteronyssinus -    6 Dermatophagoides farinae -    7 Dog epithelium (canis spp) -    8 Cat hair (irais catus) -    9 Cockroach   (Blatella americana & germanica) -    10 Grass mix midwest   (Viola, Orchard, Redtop, Shlomo) +/++    11 Parish grass (sorghum halepense) +    12 Weed mix   (common Cocklebur, Lamb s quarters, rough redroot Pigweed, Dock/Sorrel) -    13 Mug wort (artemisia vulgare) +    14 Ragweed giant/short (ambrosia spp) ++    15 White birch (Betula papyrifera) +/++    16 Tree mix 1 (Pecan, Maple BHR, Oak RVW, american [place], black [place]) +/++    17 Red cedar (juniperus [place]) -    18 Tree mix 2   (white [place], river/red Birch, black [place], common [place], american Elm) -    19 Box elder/Maple mix (acer  spp) -    20 [place] shagbark (carya ovata) +/++      Prick/prick soil     Conclusion      Intradermal Testing (Placed May 10, 2023)  No Substance Conc.  Reading (15min)  immediate Papule [mm] / Erythema [mm] Reading   (... days)  delayed Papule [mm] / Erythema [mm] Remarks   DF Standard Dust Mite - D. Farinae 1:10 +/++ 10/15  -    DP Standard Dust Mite - D. 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  - Recommend atopy screen in the future when he does not have active urticaria    Labs in [place] 2/15/2023: CRP elevate 51.1, in CBC diff Lymphocytes low and Neutrophils borderline elevated, ESR slightly elevated    These conclusions are made at the best of one's knowledge and belief based on the provided evidence such as patient's history and allergy test results and they can change over time or can be incomplete because of missing information's.    ==> Treatment Plan:  - Recommend  avoiding NSAIDs if possible and if needed, try to avoid taking high doses and favor Tylenol more than ibuprofen or naproxen  - Emergency set prednisone and cetirizine prescribed    >> try to reduce exposure to HOUSE DUST MITES (info given)  >> continue with Allegra 180mg morning and evening       Procedures Performed: Allergy tests, including prick, intradermal tests    Staff: : provider    Follow-up in Derm-Allergy clinic if necessary  ___________________________    I spent a total of 40 minutes with Michael Lindo during today s  visit. This time was spent discussing all the individual test results, correlating them to the clinical relevance, counseling the patient and/or coordinating care and performing allergy tests or procedures.

## 2023-05-15 LAB
IGE SERPL-ACNC: 70 KU/L (ref 0–114)
TRYPTASE SERPL-MCNC: 4 UG/L

## 2023-05-18 LAB — ANTI IGE ANTIBODY: ABNORMAL

## 2023-05-21 ENCOUNTER — HEALTH MAINTENANCE LETTER (OUTPATIENT)
Age: 28
End: 2023-05-21

## 2023-07-20 NOTE — PROGRESS NOTES
[facility] Dermato-allergology Note  Office visit  Encounter Date: Jul 21, 2023  ____________________________________________    CC: No chief complaint on file.      HPI:  (Jul 21, 2023)  Mr. Michael Lindo is a(n) 28 year old male who presents today as a return patient for allergy consultation  - Follow-up in Derm-Allergy clinic if necessary  - started about 1 month ago with 1 Allegra 180mg daily and this was enough to keep the hives under control  - was off Allegra for about 2 days and 24h after last Allegra hives came back --> big plaque on left flank  - otherwise feeling well in usual state of health    Physical exam:  General: In no acute distress, well-developed, well-nourished  Eyes: no conjunctivitis  ENT: no signs of rhinitis   Pulmonary: no wheezing or coughing  Skin:no active lesions    Earlier History and Allergy exams:  (May 10, 2023)  - patient still has a very strong physical urticaria. Spontaneous hives have reduced a lot on Allegra. Just small, random hives sometimes. The hives are somehow reduced in activity.  - had to stop the Hydroxyzine, made him too tired.  --> stopped the Allegra for 9 days and after couple days some spontaneous hives, but not major increase. Just the physical hives are still active.    Earlier History and Allergy exams:  (Mar 7, 2023)  - initially noticed the rash on 2/8/23 immediately after handling succulents (first time exposed to this) and potting soil (previously handled without reaction)  - applied OTC hydrocortisone on the abdomen and lower back which helped a bit with the itching but the rash resolved on its own within 12-24 hours  - this rash continued and he tested positive for COVID on Sunday 2/12/23 with symptoms such sore throat, fevers, headache, chills, sweats, myalgias, cough, dyspnea  - took ibuprofen, tylenol and naproxen for his symptoms as well during his infection  - was seen at urgent care on 2/14/23 and was treated for pneumonia with  amoxicillin for 5 days  - was seen again at urgent care on 2/15/23 for the persistent rash and prescribed prednisone 20 mg for 5 days and cetirizine 20 mg BID of cetirizine for a week and was still developing new rash  - was seen in the ER on 2/21/23 for the rash that was spreading to the neck with tightness of the throat and difficulty breathing and swallowing, also felt swelling of the tongue with discomfort, was given IV steroid, benadryl, pepcid and epinephrine and was discharged with an Epi-Pen  - was seen at urgent care on 2/23/23 and was prescribed cetirizine 10 mg twice daily and famotidine 20 mg daily and was diagnosed with erythema multiforme by the physician there and prescribed azithromycin for 5 days   - continues to take Zyrtec, last taken this morning  - states rash has improved but is still present    - urticarial papules on the right dorsal hand, right arm and thighs    Past Medical History:   There is no problem list on file for this patient.    No past medical history on file.    Allergies:  No Known Allergies    Medications:  Current Outpatient Medications   Medication     cetirizine (ZYRTEC) 10 MG tablet     EPINEPHrine (ANY BX GENERIC EQUIV) 0.3 MG/0.3ML injection 2-pack     famotidine (PEPCID) 20 MG tablet     fexofenadine (ALLEGRA) 180 MG tablet     fexofenadine (ALLEGRA) 180 MG tablet     hydrOXYzine (ATARAX) 25 MG tablet     predniSONE (DELTASONE) 50 MG tablet     No current facility-administered medications for this visit.       Social History:  The patient works as a resident life director.     Family History:  No family history on file.    Previous Labs, Allergy Tests, Dermatopathology, Imaging:  None    Referred By: No referring provider defined for this encounter.     Allergy Tests:    Past Allergy Test    Order for Future Allergy Testing:    [] Outpatient  [] Inpatient: Mai..../ Bed ....       Skin Atopy (atopic dermatitis) [] Yes   [x] No .........  Contact allergies:   [] Yes   [x]  No  possible nickel allergy as a child, none today   Hand eczema:   [] Yes   [x] No           Leading hand:   [] R   [] L       [] Ambidextrous         Drug allergies:        [] Yes   [x] No      Urticaria/Angioedema  [x] Yes   [] No   Food Allergy:  [] Yes   [x] No    Pets :  [] Yes   [x] No  Cat with no reaction      [x]  Rhinitis   [x] Conjunctivitis   [] Sinusitis   [] Polyposis   [] Otitis   [] Pharyngitis         [x]  Postnasal drip    []  none  Operations:   [] Tonsils   [] Septum   [] Sinus   [] Polyposis        [] Asthma bronchiale   [] Coughing      [x]  none  Symptoms (mostly Rhinoconjunctivitis and Asthma) aggravated by:  Season   [] I   [] II   [] III   [x] IV   [x]V   [x]VI   [x]VII   [x]VIII   [x]IX   []X   []XI   []XII     [] perennial   Day time      [] morning   [] noon      [] evening        [] night    [] whole day........  []  none  Location/changes    [] inside        [] outside   [] mountains    [] sea     [] others.............   []  none  Triggers, specific     [] animals     [] plants     [] dust              [] others ...........................    []  none  Triggers, others       [] work          [] psyche    [] sport            [] others .............................  []  none  Irritant                [] phys efforts [] smoke    [] heat/cold     [] odors  []others............... []  none    Order for PATCH TESTS  Reason for tests (suspected allergy):   Known previous allergies:   Standardized panels  [] Standard panel (40 tests)  [] Preservatives & Antimicrobials (31 tests)  [] Emulsifiers & Additives (25 tests)   [] Perfumes/Flavours & Plants (25 tests)  [] Hairdresser panel (12 tests)  [] Rubber Chemicals (22 tests)  [] Plastics (26 tests)  [] Colorants/Dyes/Food additives (20 tests)  [] Metals (implants/dental) (24 tests)  [] Local anaesthetics/NSAIDs (13 tests)  [] Antibiotics & Antimycotics (14 tests)   [] Corticosteroids (15 tests)   [] Photopatch test (62 tests)   [] others:  ...      [] Patient's own products: ...    DO NOT test if chemical or biological identity is unknown!     always ask from patient the product information and safety sheets (MSDS)       Order for PRICK TESTS    Reason for tests (suspected allergy):   Known previous allergies:     Standardized prick panels  [x] Atopic panel (20 tests)  [] Pediatric Panel (12 tests)  [] Milk, Meat, Eggs, Grains (20 tests)   [] Dust, Epithelia, Feathers (10 tests)  [] Fish, Seafood, Shellfish (17 tests)  [] Nuts, Beans (14 tests)  [] Spice, Vegetable, Fruit (17 tests)  [] Pollen Panel = Tree, Grass, Weed (24 tests)  [] Others: ...      [] Patient's own products: ...    DO NOT test if chemical or biological identity is unknown!     always ask from patient the product information and safety sheets (MSDS)     Standardized intradermal tests  [x] Penicillium notatum [x] Aspergillus fumigatus [x] House dust mites D.far & D. pteron  [] Cat    [] dog  [] Others: ...  [] Bee venom   [] Wasp venom  !!Specific protocol with dilutions!!       Order for Drug allergy tests (prick & Intradermal &  patch tests)    [] Penicillin G  [x] Ampicillin [] Cefazolin   [] Ceftriaxone   [] Ceftazidime  [] Bactrim    [] Others: ...  Order for ... as test date    Atopy Screen (Placed May 10, 2023)  No Substance Readings (15 min) Evaluation   POS Histamine 1mg/ml ++    NEG NaCl 0.9% -      No Substance Readings (15 min) Evaluation   1 Alternaria alternata (tenuis)  ++    2 Cladosporium herbarum -    3 Aspergillus fumigatus -    4 Penicillium notatum -    5 Dermatophagoides pteronyssinus -    6 Dermatophagoides farinae -    7 Dog epithelium (canis spp) -    8 Cat hair (irais catus) -    9 Cockroach   (Blatella americana & germanica) -    10 Grass mix midwest   ([place], Orchard, Red[place], Shlomo) +/++    11 Parish grass (sorghum halepense) +    12 Weed mix   (common Cocklebur, Lamb s quarters, rough redroot Pigweed, Dock/Sorrel) -    13 Mug wort (artemisia vulgare) +     14 Ragweed giant/short (ambrosia spp) ++    15 White birch (Betula papyrifera) +/++    16 Tree mix 1 (Pecan, Maple BHR, Oak RVW, american [place], black [place]) +/++    17 Red cedar (juniperus virgin[place]) -    18 Tree mix 2   (white [place], river/red Birch, black [place], common [place], american Elm) -    19 Box elder/Maple mix (acer spp) -    20 [place] shagbark (carya ovata) +/++      Prick/prick soil     Conclusion      Intradermal Testing (Placed May 10, 2023)  No Substance Conc.  Reading (15min)  immediate Papule [mm] / Erythema [mm] Reading   (... days)  delayed Papule [mm] / Erythema [mm] Remarks   DF Standard Dust Mite - D. Farinae 1:10 +/++ 10/15  -    DP Standard Dust Mite - D. Pteronyssinus 1:10 ++ 10/20  -    A Aspergillus fumigatus  1:10 -   -    P Penicillium notatum 1:10 -   -    Conclusions:     DRUG ALLERGY TEST SERIES May 10, 2023    ANTIBIOTICS    Prick Tests         Substance/ Allergen Conc Result (20 min) Remarks    Ampicillin 100mg/ml -       Intradermal Tests   immed immed delay delay      Substance Conc 1st dil  2nd dil  2 days  4 days remarks    Ampicillin 1:10 -           ________________________________    Assessment & Plan:    ==> Final Diagnosis:      # Para-viral COVID induced chronic urticaria and rarely angioedema with possible exacerbation from NSAIDS  - This is not erythema multiforme, rather this is a chronic urticaria, which can have a targetoid/annular presentation as well  - Recommend altering the antihistamine regimen  - Recommend emergency set prednisone  - Can consider Xolair if this persists    # Suspect atopic predisposition with:    Rhinoconjunctivitis and post nasal drip with exacerbation in Spring and Fall    Sensitization to the seasonal mold Alternaria and to tree, grass and ragweed pollens    Sensitization to dust mites --> relevant?  - Recommend atopy screen in the future when he does not have active urticaria    Labs in [place] 2/15/2023: CRP elevate 51.1, in CBC  diff Lymphocytes low and Neutrophils borderline elevated, ESR slightly elevated    These conclusions are made at the best of one's knowledge and belief based on the provided evidence such as patient's history and allergy test results and they can change over time or can be incomplete because of missing information's.    ==> Treatment Plan:    > Recommend avoiding NSAIDs if possible and if needed, try to avoid taking high doses and favor Tylenol more than ibuprofen or naproxen     > Emergency set prednisone and cetirizine prescribed    >> try to reduce exposure to HOUSE DUST MITES (info given)    >> continue with Allegra 180mg daily (for hives and immediate type allergies)    ___________________________    Staff: : provider    Follow-up in Derm-Allergy clinic in about 6 months  ___________________________    I spent a total of 20 minutes with Michael Lindo during today s  visit. This time was spent discussing all the individual test results, correlating them to the clinical relevance, counseling the patient and/or coordinating care

## 2023-07-21 ENCOUNTER — OFFICE VISIT (OUTPATIENT)
Dept: ALLERGY | Facility: CLINIC | Age: 28
End: 2023-07-21
Payer: COMMERCIAL

## 2023-07-21 DIAGNOSIS — L50.1 CHRONIC IDIOPATHIC URTICARIA: ICD-10-CM

## 2023-07-21 PROCEDURE — 99213 OFFICE O/P EST LOW 20 MIN: CPT | Performed by: DERMATOLOGY

## 2023-07-21 RX ORDER — FEXOFENADINE HCL 180 MG/1
180 TABLET ORAL DAILY
Qty: 60 TABLET | Refills: 6 | Status: SHIPPED | OUTPATIENT
Start: 2023-07-21 | End: 2024-07-21

## 2023-07-21 NOTE — LETTER
7/21/2023         RE: Michael Lindo  [address] [facility] [address]        Dear Colleague,    Thank you for referring your patient, Michael Lindo, to the [facility] [place]. Please see a copy of my visit note below.    [facility] D[facility]ato-[facility]ology Note  Office visit  Encounter Date: Jul 21, 2023  ____________________________________________    CC: No chief complaint on file.      HPI:  (Jul 21, 2023)  Mr. Michael Lindo is a(n) 28 year old male who presents today as a return patient for allergy consultation  - Follow-up in Derm-Allergy clinic if necessary  - started about 1 month ago with 1 Allegra 180mg daily and this was enough to keep the hives under control  - was off Allegra for about 2 days and 24h after last Allegra hives came back --> big plaque on left flank  - otherwise feeling well in usual state of health    Physical exam:  General: In no acute distress, well-developed, well-nourished  Eyes: no conjunctivitis  ENT: no signs of rhinitis   Pulmonary: no wheezing or coughing  Skin:no active lesions    Earlier History and Allergy exams:  (May 10, 2023)  - patient still has a very strong physical urticaria. Spontaneous hives have reduced a lot on Allegra. Just small, random hives sometimes. The hives are somehow reduced in activity.  - had to stop the Hydroxyzine, made him too tired.  --> stopped the Allegra for 9 days and after couple days some spontaneous hives, but not major increase. Just the physical hives are still active.    Earlier History and Allergy exams:  (Mar 7, 2023)  - initially noticed the rash on 2/8/23 immediately after handling succulents (first time exposed to this) and potting soil (previously handled without reaction)  - applied OTC hydrocortisone on the abdomen and lower back which helped a bit with the itching but the rash resolved on its own within 12-24 hours  - this rash continued and he tested positive for COVID on  Sunday 2/12/23 with symptoms such sore throat, fevers, headache, chills, sweats, myalgias, cough, dyspnea  - took ibuprofen, tylenol and naproxen for his symptoms as well during his infection  - was seen at urgent care on 2/14/23 and was treated for pneumonia with amoxicillin for 5 days  - was seen again at urgent care on 2/15/23 for the persistent rash and prescribed prednisone 20 mg for 5 days and cetirizine 20 mg BID of cetirizine for a week and was still developing new rash  - was seen in the ER on 2/21/23 for the rash that was spreading to the neck with tightness of the throat and difficulty breathing and swallowing, also felt swelling of the tongue with discomfort, was given IV steroid, benadryl, pepcid and epinephrine and was discharged with an Epi-Pen  - was seen at urgent care on 2/23/23 and was prescribed cetirizine 10 mg twice daily and famotidine 20 mg daily and was diagnosed with erythema multiforme by the physician there and prescribed azithromycin for 5 days   - continues to take Zyrtec, last taken this morning  - states rash has improved but is still present    - urticarial papules on the right dorsal hand, right arm and thighs    Past Medical History:   There is no problem list on file for this patient.    No past medical history on file.    Allergies:  No Known Allergies    Medications:  Current Outpatient Medications   Medication     cetirizine (ZYRTEC) 10 MG tablet     EPINEPHrine (ANY BX GENERIC EQUIV) 0.3 MG/0.3ML injection 2-pack     famotidine (PEPCID) 20 MG tablet     fexofenadine (ALLEGRA) 180 MG tablet     fexofenadine (ALLEGRA) 180 MG tablet     hydrOXYzine (ATARAX) 25 MG tablet     predniSONE (DELTASONE) 50 MG tablet     No current facility-administered medications for this visit.       Social History:  The patient works as a resident life director.     Family History:  No family history on file.    Previous Labs, Allergy Tests, Dermatopathology, Imaging:  None    Referred By: No  referring provider defined for this encounter.     Allergy Tests:    Past Allergy Test    Order for Future Allergy Testing:    [] Outpatient  [] Inpatient: Mai..../ Bed ....       Skin Atopy (atopic dermatitis) [] Yes   [x] No .........  Contact allergies:   [] Yes   [x] No  possible nickel allergy as a child, none today   Hand eczema:   [] Yes   [x] No           Leading hand:   [] R   [] L       [] Ambidextrous         Drug allergies:        [] Yes   [x] No      Urticaria/Angioedema  [x] Yes   [] No   Food Allergy:  [] Yes   [x] No    Pets :  [] Yes   [x] No  Cat with no reaction      [x]  Rhinitis   [x] Conjunctivitis   [] Sinusitis   [] Polyposis   [] Otitis   [] Pharyngitis         [x]  Postnasal drip    []  none  Operations:   [] Tonsils   [] Septum   [] Sinus   [] Polyposis        [] Asthma bronchiale   [] Coughing      [x]  none  Symptoms (mostly Rhinoconjunctivitis and Asthma) aggravated by:  Season   [] I   [] II   [] III   [x] IV   [x]V   [x]VI   [x]VII   [x]VIII   [x]IX   []X   []XI   []XII     [] perennial   Day time      [] morning   [] noon      [] evening        [] night    [] whole day........  []  none  Location/changes    [] inside        [] outside   [] mountains    [] sea     [] others.............   []  none  Triggers, specific     [] animals     [] plants     [] dust              [] others ...........................    []  none  Triggers, others       [] work          [] psyche    [] sport            [] others .............................  []  none  Irritant                [] phys efforts [] smoke    [] heat/cold     [] odors  []others............... []  none    Order for PATCH TESTS  Reason for tests (suspected allergy):   Known previous allergies:   Standardized panels  [] Standard panel (40 tests)  [] Preservatives & Antimicrobials (31 tests)  [] Emulsifiers & Additives (25 tests)   [] Perfumes/Flavours & Plants (25 tests)  [] Hairdresser panel (12 tests)  [] Rubber Chemicals (22  tests)  [] Plastics (26 tests)  [] Colorants/Dyes/Food additives (20 tests)  [] Metals (implants/dental) (24 tests)  [] Local anaesthetics/NSAIDs (13 tests)  [] Antibiotics & Antimycotics (14 tests)   [] Corticosteroids (15 tests)   [] Photopatch test (62 tests)   [] others: ...      [] Patient's own products: ...    DO NOT test if chemical or biological identity is unknown!     always ask from patient the product information and safety sheets (MSDS)       Order for PRICK TESTS    Reason for tests (suspected allergy):   Known previous allergies:     Standardized prick panels  [x] Atopic panel (20 tests)  [] Pediatric Panel (12 tests)  [] Milk, Meat, Eggs, Grains (20 tests)   [] Dust, Epithelia, Feathers (10 tests)  [] Fish, Seafood, Shellfish (17 tests)  [] Nuts, Beans (14 tests)  [] Spice, Vegetable, Fruit (17 tests)  [] Pollen Panel = Tree, Grass, Weed (24 tests)  [] Others: ...      [] Patient's own products: ...    DO NOT test if chemical or biological identity is unknown!     always ask from patient the product information and safety sheets (MSDS)     Standardized intradermal tests  [x] Penicillium notatum [x] Aspergillus fumigatus [x] House dust mites D.far & D. pteron  [] Cat    [] dog  [] Others: ...  [] Bee venom   [] Wasp venom  !!Specific protocol with dilutions!!       Order for Drug allergy tests (prick & Intradermal &  patch tests)    [] Penicillin G  [x] Ampicillin [] Cefazolin   [] Ceftriaxone   [] Ceftazidime  [] Bactrim    [] Others: ...  Order for ... as test date    Atopy Screen (Placed May 10, 2023)  No Substance Readings (15 min) Evaluation   POS Histamine 1mg/ml ++    NEG NaCl 0.9% -      No Substance Readings (15 min) Evaluation   1 Alternaria alternata (tenuis)  ++    2 Cladosporium herbarum -    3 Aspergillus fumigatus -    4 Penicillium notatum -    5 Dermatophagoides pteronyssinus -    6 Dermatophagoides farinae -    7 Dog epithelium (canis spp) -    8 Cat hair (irais catus) -    9  Cockroach   (Blatella americana & germanica) -    10 Grass mix midwest   (Viola, Orchard, Red[place], Shlomo) +/++    11 Parish grass (sorghum halepense) +    12 Weed mix   (common Cocklebur, Lamb s quarters, rough redroot Pigweed, Dock/Sorrel) -    13 Mug wort (artemisia vulgare) +    14 Ragweed giant/short (ambrosia spp) ++    15 White birch (Betula papyrifera) +/++    16 Tree mix 1 (Pecan, Maple BHR, Oak RVW, american [place], black Blain) +/++    17 Red cedar (juniperus virginia) -    18 Tree mix 2   (white [place], river/red Birch, black [place], common [place], american Elm) -    19 Box elder/Maple mix (acer spp) -    20 [place] shagbark (carya ovata) +/++      Prick/prick soil     Conclusion      Intradermal Testing (Placed May 10, 2023)  No Substance Conc.  Reading (15min)  immediate Papule [mm] / Erythema [mm] Reading   (... days)  delayed Papule [mm] / Erythema [mm] Remarks   DF Standard Dust Mite - D. Farinae 1:10 +/++ 10/15  -    DP Standard Dust Mite - D. 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  - Recommend atopy screen in the future when he does not have active urticaria    Labs in [place] 2/15/2023: CRP elevate 51.1, in CBC diff Lymphocytes low and Neutrophils borderline elevated, ESR slightly elevated    These conclusions are made at the best of one's knowledge and belief based on the provided evidence such as patient's history and allergy test results and they can change over time or can be incomplete because of missing information's.    ==> Treatment Plan:    > Recommend avoiding NSAIDs if possible and if needed, try to avoid taking high doses and favor Tylenol more than ibuprofen or naproxen     > Emergency set prednisone and cetirizine prescribed    >> try to reduce exposure to HOUSE DUST MITES (info given)    >> continue with Allegra 180mg daily (for hives and immediate type allergies)    ___________________________    Staff: : provider    Follow-up in Derm-Allergy clinic in about 6 months  ___________________________    I spent a total of 20 minutes with Michael Lindo during today s  visit. This time was spent discussing all the individual test results, correlating them to the clinical relevance, counseling the patient and/or coordinating care          Again, thank you for allowing me to participate in the care of your patient.        Sincerely,        Troy Joyce MD

## 2023-07-21 NOTE — NURSING NOTE
Chief Complaint   Patient presents with     Derm Problem     Hives have improved overall. Needs antihistamine refill     Shannen SANTANA RN-BSN  Dermatology Surgery  [phone]

## 2024-07-28 ENCOUNTER — HEALTH MAINTENANCE LETTER (OUTPATIENT)
Age: 29
End: 2024-07-28

## 2025-08-10 ENCOUNTER — HEALTH MAINTENANCE LETTER (OUTPATIENT)
Age: 30
End: 2025-08-10

## (undated) RX ORDER — PENICILLIN G POTASSIUM 5000000 [IU]/1
INJECTION, POWDER, FOR SOLUTION INTRAMUSCULAR; INTRAVENOUS
Status: DISPENSED
Start: 2023-05-10

## (undated) RX ORDER — AMPICILLIN 250 MG/1
INJECTION, POWDER, FOR SOLUTION INTRAMUSCULAR; INTRAVENOUS
Status: DISPENSED
Start: 2023-05-10